# Patient Record
Sex: FEMALE | Race: WHITE | NOT HISPANIC OR LATINO | URBAN - METROPOLITAN AREA
[De-identification: names, ages, dates, MRNs, and addresses within clinical notes are randomized per-mention and may not be internally consistent; named-entity substitution may affect disease eponyms.]

---

## 2019-10-02 ENCOUNTER — EMERGENCY (EMERGENCY)
Facility: HOSPITAL | Age: 44
LOS: 1 days | Discharge: HOME | End: 2019-10-02
Admitting: EMERGENCY MEDICINE
Payer: MEDICAID

## 2019-10-02 VITALS
OXYGEN SATURATION: 100 % | TEMPERATURE: 98 F | DIASTOLIC BLOOD PRESSURE: 72 MMHG | SYSTOLIC BLOOD PRESSURE: 151 MMHG | RESPIRATION RATE: 18 BRPM | HEART RATE: 90 BPM

## 2019-10-02 PROCEDURE — 99283 EMERGENCY DEPT VISIT LOW MDM: CPT

## 2019-10-02 RX ORDER — OXYCODONE AND ACETAMINOPHEN 5; 325 MG/1; MG/1
1 TABLET ORAL ONCE
Refills: 0 | Status: COMPLETED | OUTPATIENT
Start: 2019-10-02 | End: 2019-10-02

## 2019-10-02 NOTE — ED ADULT TRIAGE NOTE - CHIEF COMPLAINT QUOTE
Pt. c/o toothache in left lower tooth. Reports taking 325mg of aspirin around 6 pm without relief. States she is here for pain relief until she can see her dentist. Denies fever, chills.

## 2019-10-02 NOTE — ED PROVIDER NOTE - NSFOLLOWUPINSTRUCTIONS_ED_ALL_ED_FT
Please follow up in the Dental Clinic within 1 week. Please call [phone].  Please take all medication as prescribed. Please do not drive a motor vehicle while taking percocet as it can cause dizziness and drowsiness.

## 2019-10-02 NOTE — ED PROVIDER NOTE - CLINICAL SUMMARY MEDICAL DECISION MAKING FREE TEXT BOX
44 y/o female w/ tooth pain- no abscess on exam- will give dental block, augmentin, dental clinic f/u.

## 2019-10-02 NOTE — ED PROVIDER NOTE - PATIENT PORTAL LINK FT
You can access the FollowMyHealth Patient Portal offered by [facility] by registering at the following website: [email or web address]. By joining [employer]’s FollowMyHealth portal, you will also be able to view your health information using other applications (apps) compatible with our system.

## 2019-10-02 NOTE — ED PROVIDER NOTE - OBJECTIVE STATEMENT
45 y/o female no pmh c/o left lower tooth pain x2-3 months, worse x1 week. Pt admit sto pain with hot and cold. Pt states that about 1 week ago she had swelling to her gum and she "popped an abscess". Pt admits to draining pus from wound. Pt now c/o pain to area. Denies swelling, current drainage, difficulty breathing or swallowing, fever or chills.

## 2019-10-24 ENCOUNTER — EMERGENCY (EMERGENCY)
Facility: HOSPITAL | Age: 44
LOS: 1 days | Discharge: HOME | End: 2019-10-24
Attending: STUDENT IN AN ORGANIZED HEALTH CARE EDUCATION/TRAINING PROGRAM
Payer: MEDICAID

## 2019-10-24 VITALS
TEMPERATURE: 98 F | RESPIRATION RATE: 18 BRPM | SYSTOLIC BLOOD PRESSURE: 142 MMHG | DIASTOLIC BLOOD PRESSURE: 89 MMHG | OXYGEN SATURATION: 95 % | HEART RATE: 91 BPM

## 2019-10-24 PROBLEM — Z78.9 OTHER SPECIFIED HEALTH STATUS: Chronic | Status: ACTIVE | Noted: 2019-10-02

## 2019-10-24 PROCEDURE — 99283 EMERGENCY DEPT VISIT LOW MDM: CPT

## 2019-10-24 RX ORDER — ALPRAZOLAM 0.25 MG
1 TABLET ORAL ONCE
Refills: 0 | Status: DISCONTINUED | OUTPATIENT
Start: 2019-10-24 | End: 2019-10-24

## 2019-10-24 RX ADMIN — Medication 1 MILLIGRAM(S): at 09:18

## 2019-10-24 NOTE — ED PROVIDER NOTE - CLINICAL SUMMARY MEDICAL DECISION MAKING FREE TEXT BOX
44F with increased anxiety, unable to access her home meds due to being arrested.  No physical complaints.  No SI/HI.  Pt calm and cooperative.  Will supply one dose of home medication and discharge with PD.

## 2019-10-24 NOTE — ED ADULT TRIAGE NOTE - CHIEF COMPLAINT QUOTE
pt arrives by ambulance, under arrest, with hx of anxiety. states she needs her medications for anxiety. denies feeling anxious at this time.

## 2019-10-24 NOTE — ED PROVIDER NOTE - OBJECTIVE STATEMENT
44F pmh anxiety and pinched nerve presents to ER under arrest c/o increased anxiety.  Pt's home medications (alprazolam 1mg q 8hr) were confiscated and patient was not allowed to access them.  PD is bringing patient to central booking, but patient was complaining of increased anxiety and requesting her home meds so they came to ER for medication.  Pt states she feels she is breathing quickly and feels more anxious. 44F pmh anxiety and pinched nerve presents to ER under arrest c/o increased anxiety.  Pt's home medications (alprazolam 1mg q 8hr) were confiscated and patient was not allowed to access them.  PD is bringing patient to central booking, but patient was complaining of increased anxiety and requesting her home meds so they came to ER for medication.  Pt states she feels she is breathing quickly and feels more anxious.  Denies SI/HI.  No chest pain, abd pain, nausea/vomiting, fever, chills.

## 2019-10-24 NOTE — ED PROVIDER NOTE - PHYSICAL EXAMINATION
Gen: NAD, AOx3, in hand cuffs  Head: NCAT  HEENT: PERRL, oral mucosa moist, normal conjunctiva  Lung: CTAB, no respiratory distress  CV: rrr, no murmurs, Normal perfusion  Abd: soft, NTND  MSK: No edema, no visible deformities  Neuro: No focal neurologic deficits  Skin: No rash   Psych: mildly anxious appearing

## 2019-10-24 NOTE — ED PROVIDER NOTE - PATIENT PORTAL LINK FT
You can access the FollowMyHealth Patient Portal offered by [facility] by registering at the following website: [email or web address]. By joining [employer]’s FollowMyHealth portal, you will also be able to view your health information using other applications (apps) compatible with our system.

## 2019-10-24 NOTE — ED PROVIDER NOTE - NSFOLLOWUPINSTRUCTIONS_ED_ALL_ED_FT
- Follow up with your primary care physician within 2-3days for reevaluation.  - Return to the Emergency Department for worsening, progressive or any other concerning symptoms.

## 2020-01-12 ENCOUNTER — EMERGENCY (EMERGENCY)
Facility: HOSPITAL | Age: 45
LOS: 1 days | Discharge: HOME | End: 2020-01-12
Attending: EMERGENCY MEDICINE | Admitting: EMERGENCY MEDICINE
Payer: MEDICAID

## 2020-01-12 VITALS
RESPIRATION RATE: 18 BRPM | TEMPERATURE: 98 F | DIASTOLIC BLOOD PRESSURE: 86 MMHG | HEART RATE: 89 BPM | SYSTOLIC BLOOD PRESSURE: 166 MMHG | OXYGEN SATURATION: 99 %

## 2020-01-12 PROCEDURE — 99283 EMERGENCY DEPT VISIT LOW MDM: CPT

## 2020-01-12 RX ORDER — OXYCODONE HYDROCHLORIDE 5 MG/1
5 TABLET ORAL ONCE
Refills: 0 | Status: DISCONTINUED | OUTPATIENT
Start: 2020-01-12 | End: 2020-01-12

## 2020-01-12 RX ADMIN — Medication 1 TABLET(S): at 11:31

## 2020-01-12 RX ADMIN — OXYCODONE HYDROCHLORIDE 5 MILLIGRAM(S): 5 TABLET ORAL at 11:31

## 2020-01-12 NOTE — ED ADULT TRIAGE NOTE - CHIEF COMPLAINT QUOTE
Pt complaining of dental pain x1 week. pt states her tooth broke and since then has been having pain. pt states she saw a [facility] dentist and was prescribed Amoxicillin and Motrin with minimal relief.

## 2020-01-12 NOTE — ED PROVIDER NOTE - ATTENDING CONTRIBUTION TO CARE
Dr. Fairchild:  I have personally performed a face to face bedside history and physical examination of this patient. I have discussed the history, examination, review of systems, assessment and plan of management with the resident. I have reviewed the electronic medical record and amended it to reflect my history, review of systems, physical exam, assessment and plan.    44F c/o pain to cracked upper left molar.  Had tele-dentist consult, coming in requesting abx.  Denies fevers and other constitutional symptoms.    Exam:  - mild swelling upper gum but no fluctuance, no drainage    A/P  - likely pain from cracked tooth, no signs of abscess  - abx, f/u dentist

## 2020-01-12 NOTE — ED PROVIDER NOTE - PATIENT PORTAL LINK FT
You can access the FollowMyHealth Patient Portal offered by [facility] by registering at the following website: [email or web address]. By joining [employer]’s FollowMyHealth portal, you will also be able to view your health information using other applications (apps) compatible with our system.

## 2020-01-12 NOTE — ED PROVIDER NOTE - NSFOLLOWUPINSTRUCTIONS_ED_ALL_ED_FT
You were seen and evaluated in the Emergency Department for your tooth pain. You were evaluated clinically.    Your exam demonstrates that there is likely an infection of your tooth that will require further evaluation and intervention by a Dental Specialist. Please follow up at the Dental Clinic (contact info below) for further evaluation of your infection.     [facility]  [phone] [place] [address] [phone]    In the interim we have provided you with antibiotics (at your pharmacy of choice); please take the antibiotics as prescribed to prevent further progression of the infection.     Should you develop new or worsening tooth pain, fevers, chills, discharge from the tooth site, trouble opening your mouth/swallowing, nausea, vomiting - please return to the ED for immediate evaluation.

## 2020-01-12 NOTE — ED PROVIDER NOTE - NS ED ROS FT
Constitution: No Fever or chills, No Weight Loss,   Eyes: No visual changes  HEENT: (+) Dental Pain; No cough, No Discharge, No Rhinorrhea, No URI symptoms  Cardio: No Chest pain, No Palpitations, No Dyspnea  Resp: No SOB, No Wheezing  GI: No abdominal pain, No Nausea, No Vomiting, No Constipation, No Diarrhea  : No burning upon urination, trouble urinating, no foul odor from urine  MSK: No Back pain, No Numbness, No Tingling, No Weakness  Neuro: No Headache, No changes to Vision, No changes to Hearing, Normal Gait  Skin: No rashes, No Bruising, No Swelling

## 2020-01-12 NOTE — ED PROVIDER NOTE - CLINICAL SUMMARY MEDICAL DECISION MAKING FREE TEXT BOX
42's female, Hx: Dental Infection, Smoker -presents with 4 days of left upper molar dental pain s/p cracking the tooh 4 days ago on a piece of food. Pt denies any fevers, chills, nausea, vomiting, swelling of the face, trouble swallowing, trismus. Exam without any evidence of a dental abscess - no purulent drainage expressed, no fluctuance or protuberance evident on exam. Pt declining dental consultation at this time for further evaluation/management, reporting she has a formal extraction scheduled later this week. Requesting Augmentin and Pain medication. Will discharge home.

## 2020-01-12 NOTE — ED PROVIDER NOTE - PHYSICAL EXAMINATION
GEN - NAD; well appearing; A+Ox3   HEAD - NC/AT, No visible Ecchymosis, No Abrasions, No Lacerations/Skin Tears     EYES - EOMI, PERRL, no conjunctival pallor, no scleral icterus  ENT -   (+) Tenderness over Left Most Posterior Molar, no evidence of gingival swelling/fluctuance/or purulent drainage, (-) Trismus, (-) Facial Swelling   NECK - Neck supple, No LAD, No Swelling  PULM - CTA B/L,  symmetric breath sounds  COR -  RRR, S1 S2, no murmurs  ABD - NT/ND, soft, no guarding, no rebound, no masses    BACK - no CVA tenderness, nontender spine

## 2020-01-12 NOTE — ED PROVIDER NOTE - OBJECTIVE STATEMENT
44 female, Hx. Smoking and Prior Dental Infection - presents with 4 days of left upper molar pain that began after eating dinner. Pt denies any fevers, chills, nausea, vomiting, trismus, swelling in her mouth, difficulty breathing, or trouble swallowing. Pt reports she is in touch with a dentist and has a formal appointment on Thursday this week for a scheduled extraction of the tooth, and declines any dental consultation and treatment in [facility] ED at this time. Pt denies any purulent drainage, gingival swelling, trismus, or facial swelling. Requesting antibiotics and pain medication to get her through this week.

## 2020-01-28 ENCOUNTER — OUTPATIENT (OUTPATIENT)
Dept: OUTPATIENT SERVICES | Facility: HOSPITAL | Age: 45
LOS: 1 days | Discharge: HOME | End: 2020-01-28
Payer: MEDICAID

## 2020-01-28 PROCEDURE — 99443: CPT

## 2020-01-28 PROCEDURE — 90792 PSYCH DIAG EVAL W/MED SRVCS: CPT

## 2020-01-29 DIAGNOSIS — F43.23 ADJUSTMENT DISORDER WITH MIXED ANXIETY AND DEPRESSED MOOD: ICD-10-CM

## 2020-06-01 ENCOUNTER — OUTPATIENT (OUTPATIENT)
Dept: OUTPATIENT SERVICES | Facility: HOSPITAL | Age: 45
LOS: 1 days | End: 2020-06-01
Payer: MEDICAID

## 2020-06-09 PROCEDURE — ZZZZZ: CPT

## 2020-06-11 ENCOUNTER — EMERGENCY (EMERGENCY)
Facility: HOSPITAL | Age: 45
LOS: 1 days | Discharge: HOME | End: 2020-06-11
Attending: STUDENT IN AN ORGANIZED HEALTH CARE EDUCATION/TRAINING PROGRAM | Admitting: EMERGENCY MEDICINE
Payer: MEDICAID

## 2020-06-11 VITALS
TEMPERATURE: 98 F | HEART RATE: 110 BPM | RESPIRATION RATE: 17 BRPM | SYSTOLIC BLOOD PRESSURE: 131 MMHG | DIASTOLIC BLOOD PRESSURE: 78 MMHG

## 2020-06-11 LAB
ALBUMIN SERPL ELPH-MCNC: 4.4 G/DL — SIGNIFICANT CHANGE UP (ref 3.3–5)
ALP SERPL-CCNC: 89 U/L — SIGNIFICANT CHANGE UP (ref 40–120)
ALT FLD-CCNC: 49 U/L — HIGH (ref 4–33)
ANION GAP SERPL CALC-SCNC: 12 MMO/L — SIGNIFICANT CHANGE UP (ref 7–14)
AST SERPL-CCNC: 39 U/L — HIGH (ref 4–32)
BASOPHILS # BLD AUTO: 0.08 K/UL — SIGNIFICANT CHANGE UP (ref 0–0.2)
BASOPHILS NFR BLD AUTO: 0.6 % — SIGNIFICANT CHANGE UP (ref 0–2)
BILIRUB SERPL-MCNC: < 0.2 MG/DL — LOW (ref 0.2–1.2)
BUN SERPL-MCNC: 11 MG/DL — SIGNIFICANT CHANGE UP (ref 7–23)
CALCIUM SERPL-MCNC: 9.7 MG/DL — SIGNIFICANT CHANGE UP (ref 8.4–10.5)
CHLORIDE SERPL-SCNC: 103 MMOL/L — SIGNIFICANT CHANGE UP (ref 98–107)
CO2 SERPL-SCNC: 25 MMOL/L — SIGNIFICANT CHANGE UP (ref 22–31)
CREAT SERPL-MCNC: 0.7 MG/DL — SIGNIFICANT CHANGE UP (ref 0.5–1.3)
EOSINOPHIL # BLD AUTO: 0.39 K/UL — SIGNIFICANT CHANGE UP (ref 0–0.5)
EOSINOPHIL NFR BLD AUTO: 2.9 % — SIGNIFICANT CHANGE UP (ref 0–6)
GLUCOSE SERPL-MCNC: 109 MG/DL — HIGH (ref 70–99)
HCT VFR BLD CALC: 42.8 % — SIGNIFICANT CHANGE UP (ref 34.5–45)
HGB BLD-MCNC: 14.5 G/DL — SIGNIFICANT CHANGE UP (ref 11.5–15.5)
IMM GRANULOCYTES NFR BLD AUTO: 0.4 % — SIGNIFICANT CHANGE UP (ref 0–1.5)
LYMPHOCYTES # BLD AUTO: 36 % — SIGNIFICANT CHANGE UP (ref 13–44)
LYMPHOCYTES # BLD AUTO: 4.92 K/UL — HIGH (ref 1–3.3)
MCHC RBC-ENTMCNC: 32.7 PG — SIGNIFICANT CHANGE UP (ref 27–34)
MCHC RBC-ENTMCNC: 33.9 % — SIGNIFICANT CHANGE UP (ref 32–36)
MCV RBC AUTO: 96.6 FL — SIGNIFICANT CHANGE UP (ref 80–100)
MONOCYTES # BLD AUTO: 0.86 K/UL — SIGNIFICANT CHANGE UP (ref 0–0.9)
MONOCYTES NFR BLD AUTO: 6.3 % — SIGNIFICANT CHANGE UP (ref 2–14)
NEUTROPHILS # BLD AUTO: 7.34 K/UL — SIGNIFICANT CHANGE UP (ref 1.8–7.4)
NEUTROPHILS NFR BLD AUTO: 53.8 % — SIGNIFICANT CHANGE UP (ref 43–77)
NRBC # FLD: 0 K/UL — SIGNIFICANT CHANGE UP (ref 0–0)
PLATELET # BLD AUTO: 343 K/UL — SIGNIFICANT CHANGE UP (ref 150–400)
PMV BLD: 10.1 FL — SIGNIFICANT CHANGE UP (ref 7–13)
POTASSIUM SERPL-MCNC: 4.1 MMOL/L — SIGNIFICANT CHANGE UP (ref 3.5–5.3)
POTASSIUM SERPL-SCNC: 4.1 MMOL/L — SIGNIFICANT CHANGE UP (ref 3.5–5.3)
PROT SERPL-MCNC: 7.4 G/DL — SIGNIFICANT CHANGE UP (ref 6–8.3)
RBC # BLD: 4.43 M/UL — SIGNIFICANT CHANGE UP (ref 3.8–5.2)
RBC # FLD: 13.1 % — SIGNIFICANT CHANGE UP (ref 10.3–14.5)
SODIUM SERPL-SCNC: 140 MMOL/L — SIGNIFICANT CHANGE UP (ref 135–145)
WBC # BLD: 13.65 K/UL — HIGH (ref 3.8–10.5)
WBC # FLD AUTO: 13.65 K/UL — HIGH (ref 3.8–10.5)

## 2020-06-11 PROCEDURE — 99284 EMERGENCY DEPT VISIT MOD MDM: CPT

## 2020-06-11 PROCEDURE — 70498 CT ANGIOGRAPHY NECK: CPT | Mod: 26

## 2020-06-11 PROCEDURE — 70496 CT ANGIOGRAPHY HEAD: CPT | Mod: 26

## 2020-06-11 RX ORDER — METOCLOPRAMIDE HCL 10 MG
10 TABLET ORAL ONCE
Refills: 0 | Status: COMPLETED | OUTPATIENT
Start: 2020-06-11 | End: 2020-06-11

## 2020-06-11 RX ORDER — SODIUM CHLORIDE 9 MG/ML
1000 INJECTION INTRAMUSCULAR; INTRAVENOUS; SUBCUTANEOUS ONCE
Refills: 0 | Status: COMPLETED | OUTPATIENT
Start: 2020-06-11 | End: 2020-06-11

## 2020-06-11 RX ADMIN — SODIUM CHLORIDE 1000 MILLILITER(S): 9 INJECTION INTRAMUSCULAR; INTRAVENOUS; SUBCUTANEOUS at 16:17

## 2020-06-11 RX ADMIN — Medication 10 MILLIGRAM(S): at 16:17

## 2020-06-11 NOTE — ED ADULT TRIAGE NOTE - CHIEF COMPLAINT QUOTE
pt states she has been having head pressure, worse with movement. states she feels as if she is having short term memory loss.

## 2020-06-11 NOTE — ED PROVIDER NOTE - PHYSICAL EXAMINATION
[Const] well-appearing, resting comfortably, no acute distress  [HEENT] PERRL, EOMI, moist mucus membranes  [Neck] Supple, trachea midline  [CV] +S1/S2, no m/r/g appreciated  [Lungs] Clear to auscultations bilaterally, no adventitious lung sounds  [Abd] soft, non-tender, nondistended in all 4 quadrants  [MSK] 5/5 upper extremity and lower extremity str bilaterally  [Skin] warm, dry, well-perfused  [Neuro] A&Ox3, Cranial Nerves II-XII intact, neg dysmetria/ataxia/pronator drift, neg rhomberg

## 2020-06-11 NOTE — ED PROVIDER NOTE - OBJECTIVE STATEMENT
44 y/o F p/w head pressure x9 days since 6/2. On 6/2, sensation of vessel bursting in brain, had worst headache/excruciating pressure sensation. Sudden onset, maximal intensity at onset. Pressure/pain worsens when moving side to side or lying down. Pain is worse in the morning, feels there is more pressure. +Short term memory loss, forgot 's license once. Worsens with stress, high blood pressure. States she had some initial blurry vision 9 days ago. Worried that she has COVID. Denies shortness of breath, chest pain, flu-like illness, COVID contacts, sick contacts. Denies nausea/vomiting, head trauma injury, light sensitivity, phonophobia, current visual changes, blurry vision, flashes/floaters. Denies slurred speech, changes in strength/sensation in arms or legs, changes in gait, dysarthria, word finding difficulties. Endorses some worsening dysequilibrium in the past weak. Denies dizziness, vertigo.     Pmh: Depression/ anxiety  meds: Lexapro, alprazolam, zolpidem  Allergies: nkda  Surgeries: BL tubal ligation  Social: smokes 1ppd, EtOh socially, denies illicit drug use 46 y/o F p/w head pressure x9 days since 6/2. On 6/2, sensation of "vessel bursting in brain", had worst headache/excruciating pressure sensation. Sudden onset, maximal intensity at onset. Pressure/pain worsens when moving side to side or lying down. Pain is worse in the morning, feels there is more pressure. +Short term memory loss, forgot 's license once. Worsens with stress, high blood pressure. States she had some initial blurry vision 9 days ago. Worried that she has COVID. Denies shortness of breath, chest pain, flu-like illness, COVID contacts, sick contacts. Denies nausea/vomiting, head trauma injury, light sensitivity, phonophobia, current visual changes, blurry vision, flashes/floaters. Denies slurred speech, changes in strength/sensation in arms or legs, changes in gait, dysarthria, word finding difficulties. Endorses some worsening dysequilibrium in the past weak. Denies dizziness, vertigo.     Pmh: Depression/ anxiety  meds: Lexapro, alprazolam, zolpidem  Allergies: nkda  Surgeries: BL tubal ligation  Social: smokes 1ppd, EtOh socially, denies illicit drug use

## 2020-06-11 NOTE — ED PROVIDER NOTE - CLINICAL SUMMARY MEDICAL DECISION MAKING FREE TEXT BOX
46 y/o F w/ hx of anxiety/depression p/w maximal onset headache, now causing moderate pressure/headache x9 days. Worsens with position and during morning. No focal neuro deficits on exam. Give fluids, reglan. Obtain CTA head/neck to r/o SAH, intracranial pathology, aneurysm or bleed, approx 98% sensitivity. If negative, d/c with close neurology f/u.

## 2020-06-11 NOTE — ED PROVIDER NOTE - ATTENDING CONTRIBUTION TO CARE
I performed a history and physical exam of the patient and discussed their management with the resident.  I reviewed the resident's note and agree with the documented findings and plan of care except as noted below. My medical decision making and observations are as follows:    44 y/o F pmh anxiety/depression p/w maximal onset headache 9 days ago, now causing moderate pressure/headache.  headache has been lingering.  Worsens with position and in the morning.  Denies photophobia, phonophobia, numbness, weakness, paresthesias.  No fever, chills, nausea/vomiting/diarrhea. Pt well appearing in NAD, head NCAT, neck supple, PERRL, EOMI, equal strength and sensation through out, normal gait.  While initial pain 9 days ago sounds concerning for SAH, patient is quite well appearing today and has no neuro deficits.  Will CT head, CTA head/neck which has high sensitivity for sah - will discuss utility of lp this far out from original pain with neuro and reassess.  labs, analgesia and reassess.

## 2020-06-11 NOTE — ED PROVIDER NOTE - NSFOLLOWUPINSTRUCTIONS_ED_ALL_ED_FT
Please follow-up with neurology, a referral list has been attached to your discharge paperwork. See them within the next 2-3 days.     You were seen in the Emergency Department for headache.  1) Advance activity as tolerated.    2) Continue all previously prescribed medications as directed.    3) Follow up with your primary care physician in 24-48 hours - take copies of your results.    4) Return to the Emergency Department for worsening or persistent symptoms, and/or ANY NEW OR CONCERNING SYMPTOMS as described below.    Headache    A headache is pain or discomfort felt around the head or neck area. The specific cause of a headache may not be found as there are many types including tension headaches, migraine headaches, and cluster headaches. Watch your condition for any changes. Things you can do to manage your pain include taking over the counter and prescription medications as instructed by your health care provider, lying down in a dark quiet room, limiting stress, getting regular sleep, and refraining from alcohol and tobacco products.    SEEK IMMEDIATE MEDICAL CARE IF YOU HAVE ANY OF THE FOLLOWING SYMPTOMS: fever, vomiting, stiff neck, loss of vision, problems with speech, muscle weakness, loss of balance, trouble walking, passing out, or confusion.

## 2020-06-11 NOTE — ED PROVIDER NOTE - PATIENT PORTAL LINK FT
You can access the FollowMyHealth Patient Portal offered by [facility] by registering at the following website: [email or web address]. By joining [employer]’s FollowMyHealth portal, you will also be able to view your health information using other applications (apps) compatible with our system.

## 2020-06-11 NOTE — ED ADULT NURSE NOTE - OBJECTIVE STATEMENT
Pt aa&ox3 presenting to ED for "head pressure" starting June 2nd. Pt denies visual changes/dizziness

## 2020-06-11 NOTE — ED PROVIDER NOTE - PROGRESS NOTE DETAILS
disucssed utility of LP 9 days out of original pain.  Some studies show utility 1-2 weeks out, but the sensitivity declines and may not be of utility.  Will await CTAs, and reassess pt's symptoms.  Will use shared decision making to determine best course of action based on results.  - Meche Johnson, DO Zoe, PGY-1: Discussed risks and benefits of further testing with patient, given negative CTA head/neck. If SAH significant enough to be positive for xanthochromia, would likely also show up in CTA head/neck. Patient declined LP at this time Zoe, PGY-1: Pt reassessed multiple times in the past several hours. Doing well, symptoms improved, asx at this time. Discussed risks and benefits of further testing with patient, given negative CTA head/neck. If SAH significant enough to be positive for xanthochromia, would likely also show up in CTA head/neck. Patient declined LP at this time

## 2020-06-15 DIAGNOSIS — Z71.89 OTHER SPECIFIED COUNSELING: ICD-10-CM

## 2020-06-15 PROCEDURE — G9001: CPT

## 2020-06-23 PROBLEM — Z00.00 ENCOUNTER FOR PREVENTIVE HEALTH EXAMINATION: Status: ACTIVE | Noted: 2020-06-23

## 2020-06-30 ENCOUNTER — APPOINTMENT (OUTPATIENT)
Dept: NEUROLOGY | Facility: CLINIC | Age: 45
End: 2020-06-30

## 2020-07-07 ENCOUNTER — APPOINTMENT (OUTPATIENT)
Dept: NEUROLOGY | Facility: CLINIC | Age: 45
End: 2020-07-07
Payer: MEDICAID

## 2020-07-07 DIAGNOSIS — R51 HEADACHE: ICD-10-CM

## 2020-07-07 PROCEDURE — 99204 OFFICE O/P NEW MOD 45 MIN: CPT | Mod: 95

## 2020-07-07 NOTE — PHYSICAL EXAM
[FreeTextEntry1] : Alert and oriented. No aphasia. Short term recall 3/3 after 3 minutes. Able to spell and reverse spelling of a 5-letter word. No focal neurologic deficits observed with limitations of audiovisual [place] connection.\par Patient states she is 5'7" and weighs 216 lbs (BMI 33.8)

## 2020-07-07 NOTE — ASSESSMENT
[FreeTextEntry1] : Patient advised to have sleep specialist evaluation. Chronic daily headaches may be related to NAOMIE. She was urged to stop smoking and encouraged to lose weight with appropriate exercise and diet.\par \par She will need to follow-up in office in next 2 months for a detailed neurologic examination.
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
## 2020-08-10 ENCOUNTER — APPOINTMENT (OUTPATIENT)
Dept: PULMONOLOGY | Facility: CLINIC | Age: 45
End: 2020-08-10
Payer: MEDICAID

## 2020-08-10 VITALS
TEMPERATURE: 97.1 F | SYSTOLIC BLOOD PRESSURE: 148 MMHG | WEIGHT: 266 LBS | HEART RATE: 111 BPM | BODY MASS INDEX: 41.26 KG/M2 | DIASTOLIC BLOOD PRESSURE: 101 MMHG | RESPIRATION RATE: 17 BRPM | HEIGHT: 67.5 IN | OXYGEN SATURATION: 98 %

## 2020-08-10 DIAGNOSIS — F41.9 ANXIETY DISORDER, UNSPECIFIED: ICD-10-CM

## 2020-08-10 DIAGNOSIS — R06.83 SNORING: ICD-10-CM

## 2020-08-10 DIAGNOSIS — Z80.9 FAMILY HISTORY OF MALIGNANT NEOPLASM, UNSPECIFIED: ICD-10-CM

## 2020-08-10 DIAGNOSIS — F32.9 ANXIETY DISORDER, UNSPECIFIED: ICD-10-CM

## 2020-08-10 DIAGNOSIS — Z72.0 TOBACCO USE: ICD-10-CM

## 2020-08-10 DIAGNOSIS — G47.00 INSOMNIA, UNSPECIFIED: ICD-10-CM

## 2020-08-10 DIAGNOSIS — G47.52 REM SLEEP BEHAVIOR DISORDER: ICD-10-CM

## 2020-08-10 DIAGNOSIS — Z78.9 OTHER SPECIFIED HEALTH STATUS: ICD-10-CM

## 2020-08-10 DIAGNOSIS — Z82.49 FAMILY HISTORY OF ISCHEMIC HEART DISEASE AND OTHER DISEASES OF THE CIRCULATORY SYSTEM: ICD-10-CM

## 2020-08-10 PROCEDURE — 99407 BEHAV CHNG SMOKING > 10 MIN: CPT

## 2020-08-10 PROCEDURE — 99204 OFFICE O/P NEW MOD 45 MIN: CPT | Mod: 25

## 2020-08-11 PROBLEM — Z72.0 TOBACCO USE: Status: ACTIVE | Noted: 2020-08-11

## 2020-08-11 PROBLEM — Z78.9 SOCIAL ALCOHOL USE: Status: ACTIVE | Noted: 2020-08-11

## 2020-08-11 PROBLEM — Z80.9 FAMILY HISTORY OF MALIGNANT NEOPLASM: Status: ACTIVE | Noted: 2020-08-11

## 2020-08-11 PROBLEM — R06.83 SNORING: Status: ACTIVE | Noted: 2020-08-11

## 2020-08-11 PROBLEM — F41.9 ANXIETY AND DEPRESSION: Status: ACTIVE | Noted: 2020-08-11

## 2020-08-11 PROBLEM — G47.52 REM SLEEP BEHAVIOR DISORDER: Status: ACTIVE | Noted: 2020-08-11

## 2020-08-11 PROBLEM — Z82.49 FAMILY HISTORY OF CORONARY ARTERY DISEASE: Status: ACTIVE | Noted: 2020-08-11

## 2020-08-11 RX ORDER — ZOLPIDEM TARTRATE 10 MG/1
10 TABLET ORAL
Refills: 0 | Status: ACTIVE | COMMUNITY

## 2020-08-11 RX ORDER — ALPRAZOLAM 1 MG/1
1 TABLET ORAL
Refills: 0 | Status: ACTIVE | COMMUNITY

## 2020-08-11 RX ORDER — ESCITALOPRAM OXALATE 10 MG/1
10 TABLET, FILM COATED ORAL
Refills: 0 | Status: ACTIVE | COMMUNITY

## 2020-08-11 NOTE — PHYSICAL EXAM
[General Appearance - Well Developed] : well developed [Normal Appearance] : normal appearance [General Appearance - Well Nourished] : well nourished [Enlarged Base of the Tongue] : enlargement of the base of the tongue [IV] : IV [Neck Appearance] : the appearance of the neck was normal [Apical Impulse] : the apical impulse was normal [Heart Sounds] : normal S1 and S2 [Neck Cervical Mass (___cm)] : no neck mass was observed [] : no respiratory distress [Respiration, Rhythm And Depth] : normal respiratory rhythm and effort [Auscultation Breath Sounds / Voice Sounds] : lungs were clear to auscultation bilaterally [Nail Clubbing] : no clubbing of the fingernails [Cyanosis, Localized] : no localized cyanosis [No Focal Deficits] : no focal deficits [Oriented To Time, Place, And Person] : oriented to person, place, and time [Impaired Insight] : insight and judgment were intact [Affect] : the affect was normal [FreeTextEntry2] : No edema

## 2020-08-11 NOTE — REVIEW OF SYSTEMS
[EDS: ESS=____] : daytime somnolence: ESS=[unfilled] [Fatigue] : fatigue [Recent Wt Gain (___ Lbs)] : recent [unfilled] ~Ulb weight gain [Snoring] : snoring [A.M. Dry Mouth] : a.m. dry mouth [Witnessed Apneas] : witnessed apnea [Obesity] : obesity [A.M. Headache] : headache present upon awakening [Depression] : depression [Anxious] : anxious [Difficulty Initiating Sleep] : difficulty falling asleep [Difficulty Maintaining Sleep] : difficulty maintaining sleep [Unusual Sleep Behavior] : unusual sleep behavior [Negative] : Musculoskeletal [Postnasal Drip] : no postnasal drip [Leg Dysesthesias] : no leg dysesthesias [Late day/ Evening symptoms] : no late day/evening symptoms [Irresistible urge to move legs] : no irresistible urge to move legs because of lower extremity discomfort [Lower Extremity Discomfort] : no lower extremity discomfort [Sleep Disturbances due to LE symptoms] : ~T no sleep disturbances due to lower extremity symptoms [Hypersomnolence] : not sleeping much more than usual [Hypnogogic Hallucinations] : no hypnogogic hallucinations [Cataplexy] :  no cataplexy [Sleep Paralysis] : no sleep paralysis [Hypnopompic Hallucinations] : no hypnopompic hallucinations

## 2020-08-11 NOTE — CONSULT LETTER
[Dear  ___] : Dear  [unfilled], [Consult Letter:] : I had the pleasure of evaluating your patient, [unfilled]. [Please see my note below.] : Please see my note below. [Consult Closing:] : Thank you very much for allowing me to participate in the care of this patient.  If you have any questions, please do not hesitate to contact me. [Sincerely,] : Sincerely, [FreeTextEntry3] : Jus Pollack DO, MPH\par Pulmonary, Critical Care, and Sleep Medicine\par  of Medicine\par [employer] [facility]
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
## 2020-08-11 NOTE — ASSESSMENT
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 [Discussed Risks and Advised to Quit Smoking] : Discussed risks and advised to quit smoking [Discussed Cessation Medication] : cessation medication was discussed [Discussed Cessation Strategies] : cessation strategies were discussed [Smoking Cessation Counseling Given (more than 10 minutes) and Resources Provided] : Smoking cessation counseling given (more than 10 minutes) and resources provided [Not Ready] : Patient is not ready for cessation intervention [Contemplation] : Contemplation: The patient is considering quitting smoking [Withdrawal symptoms] : withdrawal symptoms [Stress] : stress [No Support System] : no support system [FreeTextEntry1] : This is a 45 year old female referred by Dr. Faustin for evaluation of possible sleep apnea. The patient has multiple signs and symptoms of sleep-disordered breathing include loud snoring, witnessed apnea, frequent nocturnal awakenings, and morning headaches. She also has prolong headaches and hypertension which are associated with NAOMIE. Given her weight and smoking history there is a concern for nocturnal hypoventilation especially as she is taking zolpidem. 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 This is likely caused by either untreated NAOMIE or her Lexapro. She was referred to the [facility] for a diagnostic PSG. The ramifications of NAOMIE and its potential therapeutic modalities were discussed with the patient. The patient was cautioned on the importance of avoiding drowsy driving. She will follow up with us after the PSG.\par

## 2020-08-11 NOTE — REASON FOR VISIT
[Consultation] : a consultation visit [Sleep Apnea] : sleep apnea [FreeTextEntry1] : Referred by Dr. Faustin

## 2020-08-31 DIAGNOSIS — Z01.818 ENCOUNTER FOR OTHER PREPROCEDURAL EXAMINATION: ICD-10-CM

## 2020-09-02 ENCOUNTER — APPOINTMENT (OUTPATIENT)
Dept: DISASTER EMERGENCY | Facility: CLINIC | Age: 45
End: 2020-09-02

## 2020-09-03 ENCOUNTER — APPOINTMENT (OUTPATIENT)
Dept: DISASTER EMERGENCY | Facility: CLINIC | Age: 45
End: 2020-09-03

## 2020-09-04 ENCOUNTER — APPOINTMENT (OUTPATIENT)
Dept: NEUROLOGY | Facility: CLINIC | Age: 45
End: 2020-09-04
Payer: MEDICAID

## 2020-09-04 VITALS
DIASTOLIC BLOOD PRESSURE: 85 MMHG | WEIGHT: 268 LBS | HEART RATE: 119 BPM | SYSTOLIC BLOOD PRESSURE: 137 MMHG | HEIGHT: 67.5 IN | BODY MASS INDEX: 41.57 KG/M2

## 2020-09-04 VITALS — TEMPERATURE: 97.2 F

## 2020-09-04 DIAGNOSIS — R51 HEADACHE: ICD-10-CM

## 2020-09-04 LAB — SARS-COV-2 N GENE NPH QL NAA+PROBE: NOT DETECTED

## 2020-09-04 PROCEDURE — 99214 OFFICE O/P EST MOD 30 MIN: CPT
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
## 2020-09-04 NOTE — PHYSICAL EXAM
[FreeTextEntry1] : Morbidly obese. Alert and oriented. No cognitive or communication deficits. Visual fields are full to confrontation. Optic disc margins are sharp. Pupils equal and constrict to light. Extraocular movements intact. No facial asymmetry. Hearing intact to finger rub. Palate rises symmetrically and tongue protrudes in midline. Neck is supple. No bruits heard. No weakness or sensory deficits. Tendon reflexes are all active and symmetric. Plantars are flexor. Gait and coordination intact. Heart sounds are normal. No murmurs heard.\par

## 2020-09-04 NOTE — REVIEW OF SYSTEMS
[Feeling Tired] : feeling tired [Recent Weight Gain (___ Lbs)] : recent [unfilled] ~Ulb weight gain [Sleep Disturbances] : sleep disturbances [As Noted in HPI] : as noted in HPI [Anxiety] : anxiety [Negative] : Heme/Lymph

## 2020-09-08 ENCOUNTER — APPOINTMENT (OUTPATIENT)
Dept: SLEEP CENTER | Facility: CLINIC | Age: 45
End: 2020-09-08

## 2020-09-18 ENCOUNTER — APPOINTMENT (OUTPATIENT)
Dept: BARIATRICS | Facility: CLINIC | Age: 45
End: 2020-09-18
Payer: MEDICAID

## 2020-09-18 VITALS — WEIGHT: 266 LBS | HEIGHT: 67 IN | BODY MASS INDEX: 41.75 KG/M2

## 2020-09-18 DIAGNOSIS — Z13.29 ENCOUNTER FOR SCREENING FOR OTHER SUSPECTED ENDOCRINE DISORDER: ICD-10-CM

## 2020-09-18 DIAGNOSIS — Z13.0 ENCOUNTER FOR SCREENING FOR OTHER SUSPECTED ENDOCRINE DISORDER: ICD-10-CM

## 2020-09-18 DIAGNOSIS — Z13.228 ENCOUNTER FOR SCREENING FOR OTHER SUSPECTED ENDOCRINE DISORDER: ICD-10-CM

## 2020-09-18 DIAGNOSIS — Z13.0 ENCOUNTER FOR SCREENING FOR DISEASES OF THE BLOOD AND BLOOD-FORMING ORGANS AND CERTAIN DISORDERS INVOLVING THE IMMUNE MECHANISM: ICD-10-CM

## 2020-09-18 PROCEDURE — 99205 OFFICE O/P NEW HI 60 MIN: CPT | Mod: 95

## 2020-09-22 RX ORDER — MULTIVITAMIN
TABLET ORAL
Refills: 0 | Status: ACTIVE | COMMUNITY

## 2020-09-22 RX ORDER — MELATONIN 3 MG
TABLET ORAL
Refills: 0 | Status: ACTIVE | COMMUNITY

## 2020-09-22 NOTE — CONSULT LETTER
[Dear  ___] : Dear  [unfilled], [Consult Letter:] : I had the pleasure of evaluating your patient, [unfilled]. [Please see my note below.] : Please see my note below. [Consult Closing:] : Thank you very much for allowing me to participate in the care of this patient.  If you have any questions, please do not hesitate to contact me. [Sincerely,] : Sincerely, [FreeTextEntry3] : Veronika Rodriguez MD, FACS

## 2020-09-22 NOTE — HISTORY OF PRESENT ILLNESS
[de-identified] : 45 year old woman with a long-standing history of morbid obesity, who has attempted numerous weight loss treatments without long term success. Patient is familiar with the Laparoscopic Adjustable Gastric Band, the Laparoscopic Sleeve Gastrectomy and the Laparoscopic Gastric Bypass. Patient presents today to discuss options for surgery, specifically the Laparoscopic Sleeve Gastrectomy.

## 2020-09-22 NOTE — ASSESSMENT
[FreeTextEntry1] : 45 year old woman with long-standing history of morbid obesity presents today to discuss options for weight loss surgery.  I had an extensive discussion with the patient reviewing the Laparoscopic Sleeve Gastrectomy. Diagrams were used. All questions were answered.  \par \par Complications were discussed including but not limited to: vitamin and protein deficiencies, pneumonia, urinary infection, wound infection, leaks/peritonitis possibly requiring intraabdominal drains or reoperation, bleeding, DVT, pulmonary embolus, severe reflux, sleeve obstruction, abdominal wall hernias, revisions, death, inadequate weight loss. The importance of vitamins and protein supplementation was stressed, as was the importance of follow-up and exercise. \par \par Patient encouraged to make dietary and lifestyle changes in preparation for surgery.\par \par Patient with a long history of morbid obesity.She is interested in the Laparoscopic Sleeve Gastrectomy. She was given written material to review.  Pre-operative evaluations were reviewed. She will need to lose weight prior to surgery and will be seen in office prior to surgery.She was told to call with any questions. \par \par Must stop smoking.

## 2020-09-22 NOTE — REVIEW OF SYSTEMS
[Fatigue] : fatigue [Recent Change In Weight] : ~T recent weight change [Shortness Of Breath] : shortness of breath [Negative] : Allergic/Immunologic [FreeTextEntry9] : loss of range of motion, back pain [de-identified] : headaches

## 2020-10-20 ENCOUNTER — APPOINTMENT (OUTPATIENT)
Dept: BARIATRICS | Facility: CLINIC | Age: 45
End: 2020-10-20
Payer: MEDICAID

## 2020-10-20 VITALS — BODY MASS INDEX: 41.44 KG/M2 | HEIGHT: 67 IN | WEIGHT: 264 LBS

## 2020-10-20 DIAGNOSIS — R63.4 ABNORMAL WEIGHT LOSS: ICD-10-CM

## 2020-10-20 DIAGNOSIS — G47.33 OBSTRUCTIVE SLEEP APNEA (ADULT) (PEDIATRIC): ICD-10-CM

## 2020-10-20 DIAGNOSIS — F17.200 NICOTINE DEPENDENCE, UNSPECIFIED, UNCOMPLICATED: ICD-10-CM

## 2020-10-20 DIAGNOSIS — R63.5 ABNORMAL WEIGHT GAIN: ICD-10-CM

## 2020-10-20 PROCEDURE — 99214 OFFICE O/P EST MOD 30 MIN: CPT | Mod: 95

## 2020-10-20 NOTE — REVIEW OF SYSTEMS
[Joint Pain] : joint pain [Joint Stiffness] : joint stiffness [Muscle Pain] : muscle pain [Negative] : Endocrine [Abdominal Pain] : no abdominal pain [Reflux/Heartburn] : no reflux/ heartburn [Vomiting] : no vomiting [Diarrhea] : no diarrhea [FreeTextEntry2] : weight loss since last visit. [FreeTextEntry9] : Cervical and Lumbar Region.

## 2020-10-20 NOTE — PHYSICAL EXAM
[Obese, well nourished, in no acute distress] : obese, well nourished, in no acute distress [Normal] : affect appropriate [de-identified] : .

## 2020-10-20 NOTE — HISTORY OF PRESENT ILLNESS
[de-identified] : 45 year old F undergoing workup for laparoscopic sleeve gastrectomy here for a follow up WEIGH IN TELEMEDICINE VISIT due to COVID-19 Pandemic. Weight loss since last visit.  Patient is currently in the process of completing her workup as well as a medically supervised diet. Patient continues to make efforts to improve food choices and increased activity.Pt is following a protein focused diet - 3 meals a day - consuming a sufficient amount of zero calorie liquid.  Patient knows she needs to lose weight prior to surgery. Exercise incorporating cardio and strength training limited due to chronic conditions- Hx  of Cervical / Lumbar Degenerative Disc Disease.Pt is currently under the care of a neurologist and may consider Physical Therapy/ Aquatic Therapy. All questions were answered. Discussed and reviewed further requirements needed prior to scheduling surgery. Hx of poor sleep apnea - hx of caffeine/ nicotine intake. Pt aware she needs to quit smoking before surgery. \par

## 2020-10-20 NOTE — ASSESSMENT
[FreeTextEntry1] : 45 year old F undergoing workup for laparoscopic sleeve gastrectomy here for a follow up WEIGH IN TELEMEDICINE VISIT due to COVID-19 Pandemic. Weight loss since last visit.  Patient is currently in the process of completing her workup as well as a medically supervised diet. \par \par Pre op education classes completed. \par Nutrition one on one scheduled on 10//21/2020 Psych -scheduled on 10/29/20 \par 4 additional weigh in visits prior to surgery- November, December, January , February 2021. Patient is aware she needs to lose weight prior to surgery. \par Plan to schedule GI consult and subsequent Upper EGD. \par Plan to continue to follow up with neurologist as instructed/ may consider Aquatic Physical Therapy in the near future.\par Needs letter of support/ diet history / routine labs prior to surgery.\par Appointments  and testing with cardiology /pulmonary scheduled. \par \par Nutritional counseling has been provided. The patient is encouraged to remain calorie conscious and continue a low fat, low carbohydrate, protein focus diet. Pt encouraged to participate in a daily exercise regimen incorporating cardio and strength training. \par \par Return to office in 4 weeks for next weigh in visit via telemedicine. \par

## 2020-10-21 ENCOUNTER — APPOINTMENT (OUTPATIENT)
Dept: BARIATRICS/WEIGHT MGMT | Facility: CLINIC | Age: 45
End: 2020-10-21

## 2020-10-29 ENCOUNTER — APPOINTMENT (OUTPATIENT)
Dept: BARIATRICS/WEIGHT MGMT | Facility: CLINIC | Age: 45
End: 2020-10-29
Payer: MEDICAID

## 2020-10-29 VITALS — HEIGHT: 67 IN | BODY MASS INDEX: 41.44 KG/M2 | WEIGHT: 264 LBS

## 2020-10-29 PROCEDURE — 97802 MEDICAL NUTRITION INDIV IN: CPT | Mod: 95

## 2020-11-17 ENCOUNTER — APPOINTMENT (OUTPATIENT)
Dept: BARIATRICS | Facility: CLINIC | Age: 45
End: 2020-11-17

## 2020-11-21 DIAGNOSIS — Z01.818 ENCOUNTER FOR OTHER PREPROCEDURAL EXAMINATION: ICD-10-CM

## 2020-11-22 ENCOUNTER — APPOINTMENT (OUTPATIENT)
Dept: DISASTER EMERGENCY | Facility: CLINIC | Age: 45
End: 2020-11-22

## 2020-11-25 ENCOUNTER — APPOINTMENT (OUTPATIENT)
Dept: SLEEP CENTER | Facility: CLINIC | Age: 45
End: 2020-11-25

## 2020-11-30 ENCOUNTER — APPOINTMENT (OUTPATIENT)
Dept: BARIATRICS/WEIGHT MGMT | Facility: CLINIC | Age: 45
End: 2020-11-30

## 2020-12-08 ENCOUNTER — APPOINTMENT (OUTPATIENT)
Dept: BARIATRICS/WEIGHT MGMT | Facility: CLINIC | Age: 45
End: 2020-12-08

## 2020-12-22 ENCOUNTER — APPOINTMENT (OUTPATIENT)
Dept: BARIATRICS | Facility: CLINIC | Age: 45
End: 2020-12-22

## 2021-01-19 ENCOUNTER — APPOINTMENT (OUTPATIENT)
Dept: BARIATRICS | Facility: CLINIC | Age: 46
End: 2021-01-19

## 2021-01-20 PROCEDURE — ZZZZZ: CPT

## 2021-02-16 ENCOUNTER — APPOINTMENT (OUTPATIENT)
Dept: BARIATRICS | Facility: CLINIC | Age: 46
End: 2021-02-16

## 2021-03-09 ENCOUNTER — APPOINTMENT (OUTPATIENT)
Dept: NEUROLOGY | Facility: CLINIC | Age: 46
End: 2021-03-09

## 2021-06-24 ENCOUNTER — EMERGENCY (EMERGENCY)
Facility: HOSPITAL | Age: 46
LOS: 1 days | Discharge: HOME | End: 2021-06-24
Attending: EMERGENCY MEDICINE | Admitting: EMERGENCY MEDICINE
Payer: MEDICAID

## 2021-06-24 VITALS
TEMPERATURE: 98 F | OXYGEN SATURATION: 100 % | RESPIRATION RATE: 18 BRPM | HEART RATE: 100 BPM | SYSTOLIC BLOOD PRESSURE: 143 MMHG | DIASTOLIC BLOOD PRESSURE: 84 MMHG

## 2021-06-24 VITALS
DIASTOLIC BLOOD PRESSURE: 75 MMHG | SYSTOLIC BLOOD PRESSURE: 122 MMHG | HEART RATE: 88 BPM | RESPIRATION RATE: 18 BRPM | TEMPERATURE: 98 F | OXYGEN SATURATION: 98 %

## 2021-06-24 LAB
ALBUMIN SERPL ELPH-MCNC: 4.3 G/DL — SIGNIFICANT CHANGE UP (ref 3.3–5)
ALP SERPL-CCNC: 101 U/L — SIGNIFICANT CHANGE UP (ref 40–120)
ALT FLD-CCNC: 37 U/L — HIGH (ref 4–33)
ANION GAP SERPL CALC-SCNC: 14 MMOL/L — SIGNIFICANT CHANGE UP (ref 7–14)
APPEARANCE UR: CLEAR — SIGNIFICANT CHANGE UP
AST SERPL-CCNC: 29 U/L — SIGNIFICANT CHANGE UP (ref 4–32)
BACTERIA # UR AUTO: ABNORMAL
BILIRUB SERPL-MCNC: 0.2 MG/DL — SIGNIFICANT CHANGE UP (ref 0.2–1.2)
BILIRUB UR-MCNC: NEGATIVE — SIGNIFICANT CHANGE UP
BUN SERPL-MCNC: 12 MG/DL — SIGNIFICANT CHANGE UP (ref 7–23)
CALCIUM SERPL-MCNC: 9.8 MG/DL — SIGNIFICANT CHANGE UP (ref 8.4–10.5)
CHLORIDE SERPL-SCNC: 102 MMOL/L — SIGNIFICANT CHANGE UP (ref 98–107)
CO2 SERPL-SCNC: 24 MMOL/L — SIGNIFICANT CHANGE UP (ref 22–31)
COLOR SPEC: YELLOW — SIGNIFICANT CHANGE UP
CREAT SERPL-MCNC: 0.79 MG/DL — SIGNIFICANT CHANGE UP (ref 0.5–1.3)
DIFF PNL FLD: NEGATIVE — SIGNIFICANT CHANGE UP
EPI CELLS # UR: 12 /HPF — HIGH (ref 0–5)
GLUCOSE SERPL-MCNC: 94 MG/DL — SIGNIFICANT CHANGE UP (ref 70–99)
GLUCOSE UR QL: NEGATIVE — SIGNIFICANT CHANGE UP
HCT VFR BLD CALC: 44.2 % — SIGNIFICANT CHANGE UP (ref 34.5–45)
HGB BLD-MCNC: 14.6 G/DL — SIGNIFICANT CHANGE UP (ref 11.5–15.5)
HYALINE CASTS # UR AUTO: 2 /LPF — SIGNIFICANT CHANGE UP (ref 0–7)
KETONES UR-MCNC: NEGATIVE — SIGNIFICANT CHANGE UP
LEUKOCYTE ESTERASE UR-ACNC: NEGATIVE — SIGNIFICANT CHANGE UP
MCHC RBC-ENTMCNC: 32.2 PG — SIGNIFICANT CHANGE UP (ref 27–34)
MCHC RBC-ENTMCNC: 33 GM/DL — SIGNIFICANT CHANGE UP (ref 32–36)
MCV RBC AUTO: 97.4 FL — SIGNIFICANT CHANGE UP (ref 80–100)
NITRITE UR-MCNC: NEGATIVE — SIGNIFICANT CHANGE UP
NRBC # BLD: 0 /100 WBCS — SIGNIFICANT CHANGE UP
NRBC # FLD: 0 K/UL — SIGNIFICANT CHANGE UP
PH UR: 6.5 — SIGNIFICANT CHANGE UP (ref 5–8)
PLATELET # BLD AUTO: 325 K/UL — SIGNIFICANT CHANGE UP (ref 150–400)
POTASSIUM SERPL-MCNC: 4.1 MMOL/L — SIGNIFICANT CHANGE UP (ref 3.5–5.3)
POTASSIUM SERPL-SCNC: 4.1 MMOL/L — SIGNIFICANT CHANGE UP (ref 3.5–5.3)
PROT SERPL-MCNC: 7.4 G/DL — SIGNIFICANT CHANGE UP (ref 6–8.3)
PROT UR-MCNC: ABNORMAL
RBC # BLD: 4.54 M/UL — SIGNIFICANT CHANGE UP (ref 3.8–5.2)
RBC # FLD: 12.6 % — SIGNIFICANT CHANGE UP (ref 10.3–14.5)
RBC CASTS # UR COMP ASSIST: 3 /HPF — SIGNIFICANT CHANGE UP (ref 0–4)
SODIUM SERPL-SCNC: 140 MMOL/L — SIGNIFICANT CHANGE UP (ref 135–145)
SP GR SPEC: 1.03 — HIGH (ref 1.01–1.02)
UROBILINOGEN FLD QL: SIGNIFICANT CHANGE UP
WBC # BLD: 13.77 K/UL — HIGH (ref 3.8–10.5)
WBC # FLD AUTO: 13.77 K/UL — HIGH (ref 3.8–10.5)
WBC UR QL: 2 /HPF — SIGNIFICANT CHANGE UP (ref 0–5)

## 2021-06-24 PROCEDURE — 99284 EMERGENCY DEPT VISIT MOD MDM: CPT

## 2021-06-24 PROCEDURE — 76705 ECHO EXAM OF ABDOMEN: CPT | Mod: 26

## 2021-06-24 PROCEDURE — 74177 CT ABD & PELVIS W/CONTRAST: CPT | Mod: 26

## 2021-06-24 RX ORDER — KETOROLAC TROMETHAMINE 30 MG/ML
15 SYRINGE (ML) INJECTION ONCE
Refills: 0 | Status: DISCONTINUED | OUTPATIENT
Start: 2021-06-24 | End: 2021-06-24

## 2021-06-24 RX ORDER — SODIUM CHLORIDE 9 MG/ML
1000 INJECTION INTRAMUSCULAR; INTRAVENOUS; SUBCUTANEOUS ONCE
Refills: 0 | Status: COMPLETED | OUTPATIENT
Start: 2021-06-24 | End: 2021-06-24

## 2021-06-24 RX ADMIN — Medication 15 MILLIGRAM(S): at 22:01

## 2021-06-24 RX ADMIN — SODIUM CHLORIDE 1000 MILLILITER(S): 9 INJECTION INTRAMUSCULAR; INTRAVENOUS; SUBCUTANEOUS at 21:35

## 2021-06-24 NOTE — ED ADULT NURSE NOTE - OBJECTIVE STATEMENT
patient  Alert & ox3, complains of  of right  sided abd pain and BRBPR since yesterday, pt . evaluated by MD.Placed 20g angiocath right  AC., labs drawn and sent. Patient is comfortable , patient will be waiting for results, further evaluation and disposition.  made comfortable.will continue to monitor.

## 2021-06-24 NOTE — ED PROVIDER NOTE - CLINICAL SUMMARY MEDICAL DECISION MAKING FREE TEXT BOX
46 yr old with BRBPR , for 2 days no rectal pain  or external hemorrhoids, will check cbc cmp , occult blood   RUQ pain , may or may not be related, will check UA CT scan and reassess

## 2021-06-24 NOTE — ED PROVIDER NOTE - NSFOLLOWUPINSTRUCTIONS_ED_ALL_ED_FT
Follow up with gastroenterology for colonoscopy as soon as possible   return for recurrent or heavy bleeding  avoid aspirin and ibuprofen Follow up with gastroenterology for colonoscopy as soon as possible   return for recurrent or heavy bleeding  avoid aspirin and ibuprofen    also discuss with GI steatosis    consider  obesity medicine:   Siobhan Barclay  [phone]

## 2021-06-24 NOTE — ED ADULT TRIAGE NOTE - CHIEF COMPLAINT QUOTE
p/t c/o of rt sided abd pain and BRBPR since yesterday, denies any nausea or vomiting, denies blood thinner use

## 2021-06-24 NOTE — ED PROVIDER NOTE - CARE PLAN
Principal Discharge DX:	BRBPR (bright red blood per rectum)   Principal Discharge DX:	BRBPR (bright red blood per rectum)  Secondary Diagnosis:	Steatosis of liver

## 2021-06-24 NOTE — ED PROVIDER NOTE - OBJECTIVE STATEMENT
46 yr old female c/o blood with M, nml brown stool, with blood in toilet, today large tiago, no rectal pain, no hx of hemorhois,. Denies change in BH, no n/v. C/o worsening RUQ pain radiating to back for months. No w/u done. Denies dysuria, hematuria, no change with eating. no bloating.   On lexapro and xanex for depression, no change recently   No travel, no bad food noted.  S/p tubal ligation

## 2021-07-03 ENCOUNTER — EMERGENCY (EMERGENCY)
Facility: HOSPITAL | Age: 46
LOS: 1 days | Discharge: HOME | End: 2021-07-03
Attending: STUDENT IN AN ORGANIZED HEALTH CARE EDUCATION/TRAINING PROGRAM | Admitting: STUDENT IN AN ORGANIZED HEALTH CARE EDUCATION/TRAINING PROGRAM
Payer: MEDICAID

## 2021-07-03 VITALS
DIASTOLIC BLOOD PRESSURE: 81 MMHG | HEART RATE: 110 BPM | SYSTOLIC BLOOD PRESSURE: 151 MMHG | TEMPERATURE: 98 F | RESPIRATION RATE: 18 BRPM | OXYGEN SATURATION: 98 %

## 2021-07-03 VITALS
TEMPERATURE: 98 F | DIASTOLIC BLOOD PRESSURE: 94 MMHG | SYSTOLIC BLOOD PRESSURE: 148 MMHG | RESPIRATION RATE: 16 BRPM | HEART RATE: 91 BPM | OXYGEN SATURATION: 100 %

## 2021-07-03 PROCEDURE — 99283 EMERGENCY DEPT VISIT LOW MDM: CPT

## 2021-07-03 RX ORDER — IBUPROFEN 200 MG
600 TABLET ORAL ONCE
Refills: 0 | Status: COMPLETED | OUTPATIENT
Start: 2021-07-03 | End: 2021-07-03

## 2021-07-03 RX ORDER — ACETAMINOPHEN 500 MG
975 TABLET ORAL ONCE
Refills: 0 | Status: COMPLETED | OUTPATIENT
Start: 2021-07-03 | End: 2021-07-03

## 2021-07-03 RX ORDER — LIDOCAINE 4 G/100G
10 CREAM TOPICAL ONCE
Refills: 0 | Status: COMPLETED | OUTPATIENT
Start: 2021-07-03 | End: 2021-07-03

## 2021-07-03 RX ORDER — OXYCODONE HYDROCHLORIDE 5 MG/1
1 TABLET ORAL
Qty: 4 | Refills: 0
Start: 2021-07-03 | End: 2021-07-06

## 2021-07-03 RX ADMIN — Medication 600 MILLIGRAM(S): at 18:15

## 2021-07-03 RX ADMIN — Medication 975 MILLIGRAM(S): at 16:07

## 2021-07-03 RX ADMIN — LIDOCAINE 10 MILLILITER(S): 4 CREAM TOPICAL at 18:15

## 2021-07-03 NOTE — ED PROVIDER NOTE - PHYSICAL EXAMINATION
G: NAD, cooperative with exam   H: NCAT  E: EOMI, no conjunctival pallor, no oropharyngeal edema/exudate noted, ttp over left upper molar, s/p filling (which pt states she did at home)  M: Mucous membranes moist   R: CTABL, nWOB  C: Nl S1/S2, no mrg  A: Soft, NT/ND, no rebound/guarding   MSK: no calf tenderness, no LE edema

## 2021-07-03 NOTE — ED PROVIDER NOTE - OBJECTIVE STATEMENT
44 female, Hx. Smoking and Prior Dental Infection - presents with left upper molar pain. Onset 2 days ago. Has been trying to get an apt with oral surgeon, though unsuccessful secondary to insurance issues. Last took Advil 11am today. Pain worsens with food chewing. Low po intake as a result. No F/C/NS/N/V/D. No difficulty swallowing or chest pain or SOB.

## 2021-07-03 NOTE — ED PROVIDER NOTE - NSFOLLOWUPCLINICS_GEN_ALL_ED_FT
[facility]  Dental  [address] [place] [id number]  Phone: [phone]  Fax:

## 2021-07-03 NOTE — PROGRESS NOTE ADULT - SUBJECTIVE AND OBJECTIVE BOX
CC: 46 Year old female presents with tooth pain in the upper left quad with associated facial and gingival swelling for two days.    HPI: Patient reports worsening pain and swelling in ULQ . Pt states pain started two days ago after placing temporary filling on and upper left tooth to "prevent food from getting in". Pain is constant, throbbing. Prior to dental evaluation, ED gave the patient pain meds with improvement in symptoms.     Med HX: Patient states " I had an unknown stomach ulcer, and I cannot take advil."  No significant past surgical history    DENTAL PAIN    8    SysAdmin_VisitLink      Allergies: No Known Drug Allergies  strawberry (Angioedema; Blisters; Anaphylaxis)    RX:    EOE: (+) mild swelling of upper left cheek  TMJ (WNL)  Trismus (-)  LAD (-)  Dysphagia (-)    IOE: Permanent dentition.   (+) Grossly decayed and fractured tooth #15 with temporary filling  (+) swelling of buccal vestibule adjacent to tooth 15  (+) palpation tender on the upper left buccal vestibule  Percussion (+) tooth #15 sensitive   Tongue/Floor of Mouth (WNL)  Mobility (-)     Radiographs: PA taken and interpreted.   1. #15 grossly decayed with large temporary filling and PARL  2. #16 large mesial decay.       Assessment: Symptomatic apical periodontitis, gross caries and acute abscess tooth 15.    Treatment: Discussed radiographic and clinical findings with patient. Disccused RBA of recommended treatment. Obtained verbal consent. Applied 20% benzocaine. Administered 2 carpules 4% septocaine 1:100k epi via local infiltration with changing of needles after each administration. Incised to bone, dissected fascial planes, minor hemopurulence appreciated. Irrigated copiously with sterline saline. Penrose drain placed with 1 3-0 vicyrl suture. Hemostasis achieved. POIG. All questions answered.    Recommendations:   1. OTC pain medication as needed.  2. Per ED, complete course of antibiotics.  3. F/U in 2-3 business days for drain removal with either outpatient dentist or [facility] dental clinic [phone].  4. If any difficulty breathing/swallowing or fever and swelling occur, return to ED.      Jeffrey Baron DDS  Marilyn Reid DDS  Meagan Oro DDS [id number]

## 2021-07-03 NOTE — ED PROVIDER NOTE - PROGRESS NOTE DETAILS
Trudi Barrios MD (PGY2) -  Pt seen & reassessed.  Pt symptomatically improved.  NAD, VSS, pt tolerated PO & ambulated w/steady unassisted gait in the ED.  We discussed the results of ED w/u w/patient (incl. presumptive Emergency Department dx, associated anticipatory guidance, stressing importance of prompt f/u, return precautions), & gave them a copy of results.  Patient verbalized understanding of ED course & agreed with our f/u recommendations, has decisional making capacity.  Pt st they will f/u w/PMD within the next 3 days; pt agrees to call today or tomorrow for an appointment. Pt agrees to return to the ED if there is any worsening or concerning symptoms.

## 2021-07-03 NOTE — ED PROVIDER NOTE - PATIENT PORTAL LINK FT
You can access the FollowMyHealth Patient Portal offered by [facility] by registering at the following website: [email or web address]. By joining [facility]’s FollowMyHealth portal, you will also be able to view your health information using other applications (apps) compatible with our system.

## 2021-07-03 NOTE — ED ADULT NURSE NOTE - NSIMPLEMENTINTERV_GEN_ALL_ED
Implemented All Universal Safety Interventions:  [place] to call system. Call bell, personal items and telephone within reach. Instruct patient to call for assistance. Room bathroom lighting operational. Non-slip footwear when patient is off stretcher. Physically safe environment: no spills, clutter or unnecessary equipment. Stretcher in lowest position, wheels locked, appropriate side rails in place.

## 2021-07-03 NOTE — ED PROVIDER NOTE - NSFOLLOWUPINSTRUCTIONS_ED_ALL_ED_FT
You were seen in the emergency department for: tooth pain  From this ED visit you were prescribed: Augmentin one tablet two times per day for 7 days     FOLLOW UP in 2-3 business days for drain removal with either outpatient dentist or [facility] dental clinic [phone].    If you experience any difficulty breathing/swallowing or fever and swelling occur, return to ED.    You may be contacted by the Emergency Department Referrals Coordinator to set up your follow-up appointment within 24-48 hours of your discharge, Monday to Friday. We recommend you follow up with: dentistry     Please return to the Emergency Department if you experience any of the following symptoms:   - Shortness of breath or trouble breathing  - Pressure, pain or tightness in the chest  - Face drooping, arm weakness or speech difficulty  - Persistence of severe vomiting  - Head injury or loss of consciousness  - Nonstop bleeding or an open wound    (1) Follow up with your primary care physician within the next 24-48 hours as discussed. In addition, we did not find evidence of a life threatening illness on your testing here today, but listed below are the specialists that will be necessary to see as an outpatient to continue the workup.  Please call the numbers listed below or [phone] to set up the necessary appointments.  (2) Take Tylenol (up to 1000mg or 1 g)  and/or Motrin (up to 600mg) up to every 6 hours as needed for pain.   (3) If you had an IV (intravenous) line placed, it was removed. Sometimes, after IV removal, that area can be tender for a few days; if it develops redness and swelling, those could be signs of infection; in which case, return to the Emergency Department for assessment.  (4) Please continue taking all of your home medications as directed.

## 2021-07-03 NOTE — ED PROVIDER NOTE - NS ED ROS FT
Gen: No F/C/NS  Eyes: No changes in vision   ENT: No ear pain, congestion, sore throat. +tooth pain +pain with chewing   Resp: No cough or trouble breathing  Cardiovascular: No chest pain or palpitation  Gastroenteric: No N/V/D  :  No change in urine output, dysuria or hematuria   MS: No joint or muscle pain   Neuro: No headache; no abnormal movements

## 2021-07-03 NOTE — ED ADULT TRIAGE NOTE - CHIEF COMPLAINT QUOTE
Pt c/o toothache to top left molar, requesting to see oral surgeon. Pt states she takes Advil but provides little relief. Denies PMH.

## 2021-07-03 NOTE — ED ADULT NURSE NOTE - OBJECTIVE STATEMENT
Pt received AOx4, ambulatory at baseline w. no pmhx presents to the ED /w chief complaint of toothache to the top left molar. Pt states she is supposed to see her dentist on Monday, but pain became intolerable so decided to come to ED. Pt also endorses left sided headache. Denies any other symptoms. Medicated per MD order. Daily smoker.

## 2021-07-03 NOTE — PROGRESS NOTE ADULT - NSICDXPILOT_GEN_ALL_CORE
[place] Propranolol Pregnancy And Lactation Text: This medication is Pregnancy Category C and it isn't known if it is safe during pregnancy. It is excreted in breast milk.

## 2021-07-03 NOTE — ED PROVIDER NOTE - CLINICAL SUMMARY MEDICAL DECISION MAKING FREE TEXT BOX
44 female, Hx. Smoking and Prior Dental Infection - presents with left upper molar pain. L upper molar pain. Plan: analgesics, dentist

## 2021-08-01 ENCOUNTER — OUTPATIENT (OUTPATIENT)
Dept: OUTPATIENT SERVICES | Facility: HOSPITAL | Age: 46
LOS: 1 days | End: 2021-08-01
Payer: MEDICAID

## 2021-08-09 ENCOUNTER — APPOINTMENT (OUTPATIENT)
Dept: GASTROENTEROLOGY | Facility: CLINIC | Age: 46
End: 2021-08-09
Payer: MEDICAID

## 2021-08-09 VITALS
OXYGEN SATURATION: 95 % | BODY MASS INDEX: 43.95 KG/M2 | DIASTOLIC BLOOD PRESSURE: 74 MMHG | WEIGHT: 280 LBS | TEMPERATURE: 97.2 F | SYSTOLIC BLOOD PRESSURE: 136 MMHG | HEART RATE: 111 BPM | HEIGHT: 67 IN

## 2021-08-09 DIAGNOSIS — E66.01 MORBID (SEVERE) OBESITY DUE TO EXCESS CALORIES: ICD-10-CM

## 2021-08-09 DIAGNOSIS — K62.5 HEMORRHAGE OF ANUS AND RECTUM: ICD-10-CM

## 2021-08-09 DIAGNOSIS — Z80.1 FAMILY HISTORY OF MALIGNANT NEOPLASM OF TRACHEA, BRONCHUS AND LUNG: ICD-10-CM

## 2021-08-09 DIAGNOSIS — K76.0 FATTY (CHANGE OF) LIVER, NOT ELSEWHERE CLASSIFIED: ICD-10-CM

## 2021-08-09 DIAGNOSIS — Z12.11 ENCOUNTER FOR SCREENING FOR MALIGNANT NEOPLASM OF COLON: ICD-10-CM

## 2021-08-09 PROCEDURE — 99204 OFFICE O/P NEW MOD 45 MIN: CPT

## 2021-08-09 RX ORDER — CLINDAMYCIN HYDROCHLORIDE 300 MG/1
300 CAPSULE ORAL
Qty: 21 | Refills: 0 | Status: ACTIVE | COMMUNITY
Start: 2021-07-07

## 2021-08-09 RX ORDER — CYCLOBENZAPRINE HYDROCHLORIDE 5 MG/1
5 TABLET, FILM COATED ORAL
Qty: 60 | Refills: 0 | Status: ACTIVE | COMMUNITY
Start: 2021-08-02

## 2021-08-09 RX ORDER — ALPRAZOLAM 0.25 MG/1
0.25 TABLET ORAL
Refills: 0 | Status: ACTIVE | COMMUNITY

## 2021-08-09 RX ORDER — CHLORHEXIDINE GLUCONATE 4 %
LIQUID (ML) TOPICAL
Refills: 0 | Status: ACTIVE | COMMUNITY

## 2021-08-09 RX ORDER — TIZANIDINE 4 MG/1
4 TABLET ORAL
Qty: 60 | Refills: 0 | Status: ACTIVE | COMMUNITY
Start: 2021-08-03

## 2021-08-09 RX ORDER — OXYCODONE HYDROCHLORIDE 5 MG/1
5 CAPSULE ORAL
Qty: 4 | Refills: 0 | Status: ACTIVE | COMMUNITY
Start: 2021-07-04

## 2021-08-09 RX ORDER — HYDROCODONE BITARTRATE AND ACETAMINOPHEN 5; 325 MG/1; MG/1
5-325 TABLET ORAL
Qty: 12 | Refills: 0 | Status: ACTIVE | COMMUNITY
Start: 2021-07-10

## 2021-08-09 RX ORDER — AMOXICILLIN AND CLAVULANATE POTASSIUM 875; 125 MG/1; MG/1
875-125 TABLET, COATED ORAL
Qty: 14 | Refills: 0 | Status: ACTIVE | COMMUNITY
Start: 2021-07-03

## 2021-08-09 NOTE — ASSESSMENT
[FreeTextEntry1] : Patient is referred for screening colonoscopy.  She did have an episode of rectal bleeding and presented to the emergency room.  This is likely due to hemorrhoids.

## 2021-08-09 NOTE — PHYSICAL EXAM
[General Appearance - Alert] : alert [General Appearance - In No Acute Distress] : in no acute distress [Sclera] : the sclera and conjunctiva were normal [PERRL With Normal Accommodation] : pupils were equal in size, round, and reactive to light [Extraocular Movements] : extraocular movements were intact [Outer Ear] : the ears and nose were normal in appearance [Oropharynx] : the oropharynx was normal [Neck Appearance] : the appearance of the neck was normal [Neck Cervical Mass (___cm)] : no neck mass was observed [Jugular Venous Distention Increased] : there was no jugular-venous distention [Thyroid Diffuse Enlargement] : the thyroid was not enlarged [Thyroid Nodule] : there were no palpable thyroid nodules [Auscultation Breath Sounds / Voice Sounds] : lungs were clear to auscultation bilaterally [Heart Rate And Rhythm] : heart rate was normal and rhythm regular [Heart Sounds] : normal S1 and S2 [Murmurs] : no murmurs [Heart Sounds Gallop] : no gallops [Heart Sounds Pericardial Friction Rub] : no pericardial rub [Bowel Sounds] : normal bowel sounds [Abdomen Soft] : soft [Abdomen Tenderness] : non-tender [] : no hepato-splenomegaly [Abdomen Mass (___ Cm)] : no abdominal mass palpated [No CVA Tenderness] : no ~M costovertebral angle tenderness [No Spinal Tenderness] : no spinal tenderness [Abnormal Walk] : normal gait [Nail Clubbing] : no clubbing  or cyanosis of the fingernails [Musculoskeletal - Swelling] : no joint swelling seen [Motor Tone] : muscle strength and tone were normal [Impaired Insight] : insight and judgment were intact [Oriented To Time, Place, And Person] : oriented to person, place, and time [Affect] : the affect was normal

## 2021-08-09 NOTE — HISTORY OF PRESENT ILLNESS
[FreeTextEntry1] : Patient with rectal bleeding in June.  She presented to the emergency room.  Her hematocrit was 44.  She underwent an abdominal sonogram that showed severe fatty liver.  The gallbladder was normal.  She also had a CAT scan of the abdomen and pelvis that showed biliary sludge and a fatty liver.\par She has no further bleeding.  Her bowel movements are usually soft.  She denies any heartburn or dysphagia.  She was considering bariatric surgery but is no longer considering this option.

## 2021-08-09 NOTE — REASON FOR VISIT
[Initial Evaluation] : an initial evaluation [FreeTextEntry1] : Rectal bleeding, Screening colonoscopy

## 2021-08-10 RX ORDER — POLYETHYLENE GLYCOL 3350, SODIUM SULFATE, SODIUM CHLORIDE, POTASSIUM CHLORIDE, ASCORBIC ACID, SODIUM ASCORBATE 140-9-5.2G
140 KIT ORAL
Qty: 1 | Refills: 0 | Status: ACTIVE | COMMUNITY
Start: 2021-08-09 | End: 1900-01-01

## 2021-08-28 ENCOUNTER — EMERGENCY (EMERGENCY)
Facility: HOSPITAL | Age: 46
LOS: 1 days | Discharge: HOME | End: 2021-08-28
Attending: EMERGENCY MEDICINE | Admitting: EMERGENCY MEDICINE
Payer: MEDICAID

## 2021-08-28 ENCOUNTER — TRANSCRIPTION ENCOUNTER (OUTPATIENT)
Age: 46
End: 2021-08-28

## 2021-08-28 VITALS
RESPIRATION RATE: 20 BRPM | TEMPERATURE: 99 F | OXYGEN SATURATION: 95 % | DIASTOLIC BLOOD PRESSURE: 89 MMHG | HEART RATE: 112 BPM | SYSTOLIC BLOOD PRESSURE: 142 MMHG

## 2021-08-28 VITALS
SYSTOLIC BLOOD PRESSURE: 110 MMHG | DIASTOLIC BLOOD PRESSURE: 60 MMHG | RESPIRATION RATE: 22 BRPM | OXYGEN SATURATION: 95 % | HEART RATE: 100 BPM

## 2021-08-28 LAB
ALBUMIN SERPL ELPH-MCNC: 3.7 G/DL — SIGNIFICANT CHANGE UP (ref 3.3–5)
ALP SERPL-CCNC: 97 U/L — SIGNIFICANT CHANGE UP (ref 40–120)
ALT FLD-CCNC: 40 U/L — HIGH (ref 4–33)
ANION GAP SERPL CALC-SCNC: 16 MMOL/L — HIGH (ref 7–14)
APTT BLD: 30.3 SEC — SIGNIFICANT CHANGE UP (ref 27–36.3)
AST SERPL-CCNC: 26 U/L — SIGNIFICANT CHANGE UP (ref 4–32)
BILIRUB SERPL-MCNC: <0.2 MG/DL — SIGNIFICANT CHANGE UP (ref 0.2–1.2)
BUN SERPL-MCNC: 7 MG/DL — SIGNIFICANT CHANGE UP (ref 7–23)
CALCIUM SERPL-MCNC: 9.2 MG/DL — SIGNIFICANT CHANGE UP (ref 8.4–10.5)
CHLORIDE SERPL-SCNC: 97 MMOL/L — LOW (ref 98–107)
CO2 SERPL-SCNC: 27 MMOL/L — SIGNIFICANT CHANGE UP (ref 22–31)
CREAT SERPL-MCNC: 0.52 MG/DL — SIGNIFICANT CHANGE UP (ref 0.5–1.3)
D DIMER BLD IA.RAPID-MCNC: <150 NG/ML DDU — SIGNIFICANT CHANGE UP
GLUCOSE SERPL-MCNC: 188 MG/DL — HIGH (ref 70–99)
HCT VFR BLD CALC: 40.1 % — SIGNIFICANT CHANGE UP (ref 34.5–45)
HGB BLD-MCNC: 13.6 G/DL — SIGNIFICANT CHANGE UP (ref 11.5–15.5)
INR BLD: 0.96 RATIO — SIGNIFICANT CHANGE UP (ref 0.88–1.16)
MAGNESIUM SERPL-MCNC: 2 MG/DL — SIGNIFICANT CHANGE UP (ref 1.6–2.6)
MCHC RBC-ENTMCNC: 33.2 PG — SIGNIFICANT CHANGE UP (ref 27–34)
MCHC RBC-ENTMCNC: 33.9 GM/DL — SIGNIFICANT CHANGE UP (ref 32–36)
MCV RBC AUTO: 97.8 FL — SIGNIFICANT CHANGE UP (ref 80–100)
NRBC # BLD: 0 /100 WBCS — SIGNIFICANT CHANGE UP
NRBC # FLD: 0 K/UL — SIGNIFICANT CHANGE UP
PHOSPHATE SERPL-MCNC: 3.6 MG/DL — SIGNIFICANT CHANGE UP (ref 2.5–4.5)
PLATELET # BLD AUTO: 282 K/UL — SIGNIFICANT CHANGE UP (ref 150–400)
POTASSIUM SERPL-MCNC: 4.6 MMOL/L — SIGNIFICANT CHANGE UP (ref 3.5–5.3)
POTASSIUM SERPL-SCNC: 4.6 MMOL/L — SIGNIFICANT CHANGE UP (ref 3.5–5.3)
PROT SERPL-MCNC: 6.5 G/DL — SIGNIFICANT CHANGE UP (ref 6–8.3)
PROTHROM AB SERPL-ACNC: 11 SEC — SIGNIFICANT CHANGE UP (ref 10.6–13.6)
RBC # BLD: 4.1 M/UL — SIGNIFICANT CHANGE UP (ref 3.8–5.2)
RBC # FLD: 13 % — SIGNIFICANT CHANGE UP (ref 10.3–14.5)
SARS-COV-2 RNA SPEC QL NAA+PROBE: SIGNIFICANT CHANGE UP
SODIUM SERPL-SCNC: 140 MMOL/L — SIGNIFICANT CHANGE UP (ref 135–145)
WBC # BLD: 12.15 K/UL — HIGH (ref 3.8–10.5)
WBC # FLD AUTO: 12.15 K/UL — HIGH (ref 3.8–10.5)

## 2021-08-28 PROCEDURE — 93970 EXTREMITY STUDY: CPT | Mod: 26

## 2021-08-28 PROCEDURE — 93010 ELECTROCARDIOGRAM REPORT: CPT

## 2021-08-28 PROCEDURE — 99285 EMERGENCY DEPT VISIT HI MDM: CPT | Mod: 25

## 2021-08-28 PROCEDURE — 71275 CT ANGIOGRAPHY CHEST: CPT | Mod: 26

## 2021-08-28 NOTE — ED PROVIDER NOTE - NS ED ROS FT
REVIEW OF SYSTEMS:    CONSTITUTIONAL: No weakness, fevers or chills  EYES/ENT: No visual changes;  No vertigo or throat pain   NECK: No pain or stiffness  BACK: no pain or lesions   RESPIRATORY: No cough, wheezing, hemoptysis; No shortness of breath  CARDIOVASCULAR:+chest pressure   GASTROINTESTINAL: No abdominal or epigastric pain. No nausea, vomiting, or hematemesis; No diarrhea or constipation. No melena or hematochezia.  GENITOURINARY: No dysuria, frequency or hematuria  NEUROLOGICAL: No numbness or weakness  EXTREMITIES: no varicose veins, + pain in LE  SKIN: No itching, rashes

## 2021-08-28 NOTE — ED PROVIDER NOTE - CLINICAL SUMMARY MEDICAL DECISION MAKING FREE TEXT BOX
47 y/o with LE swelling and chest discomfort, Wells score 4.5, will get CTA to r/o PE and LE US to r/o DVT's. Lower likelihood CHF given uneven nature of swelling and recent echo normal.

## 2021-08-28 NOTE — ED ADULT NURSE NOTE - OBJECTIVE STATEMENT
Pt received to room 13. Pt A&Ox4, ambulatory. Pt c/o increasing lower extremity swelling since yesterday, worse on the right side. Pt states that she began to have "dull" chest pain while she was getting triaged. Pt states that she has intermittent SOB and coughing but pt states she always has these symptoms due to smoking. Respirations equal and unlabored, no acute distress noted. Denies headaches, dizziness, fever, cough, chills, n/v/d, recent sick contacts. 20g IV placed in left ac, labs drawn and sent as ordered. Cardiac monitor in place, NSR noted. Vital signs as noted, comfort measures provided, call bell within reach. Assessment ongoing.

## 2021-08-28 NOTE — ED PROVIDER NOTE - PHYSICAL EXAMINATION
PHYSICAL EXAM:  T(C): 36.6 (08-28-21 @ 07:33), Max: 37.2 (08-28-21 @ 05:24)  HR: 101 (08-28-21 @ 07:33) (101 - 112)  BP: 131/77 (08-28-21 @ 07:33) (131/77 - 142/89)  RR: 20 (08-28-21 @ 07:33) (20 - 20)  SpO2: 98% (08-28-21 @ 07:33) (95% - 98%)    GENERAL: NAD, well-developed  HEAD:  Atraumatic, Normocephalic  EYES: EOMI, PERRLA, conjunctiva and sclera clear  NECK: Supple, No JVD  CHEST/LUNG: Clear to auscultation bilaterally; No wheeze  HEART: Regular rhythm, tachycardic; No murmurs, rubs, or gallops  ABDOMEN: Soft, Nontender, Nondistended; Bowel sounds present  EXTREMITIES:  2+ Peripheral Pulses, No clubbing, cyanosis.  b/l swelling, nonpitting   PSYCH: AAOx3  NEUROLOGY: non-focal  SKIN: No rashes or lesions

## 2021-08-28 NOTE — ED PROVIDER NOTE - ATTENDING CONTRIBUTION TO CARE
DR. HAMILTON, ATTENDING MD-  I performed a face to face bedside interview with the patient regarding history of present illness, review of symptoms and past medical history. I completed an independent physical exam.  I have discussed the patient's plan of care with the resident.   Documentation as above in the note.    45 y/o female, smokes tobacco, here with b/l le swelling R>L, sob and cp since last night.  No ocp use, no recent long travel or recent surg.  Eval for pna ptx pe/dvt covid obtain cbc cmp trop ekg u/s venous duplex ctpa covid swab reassess.

## 2021-08-28 NOTE — ED PROVIDER NOTE - PATIENT PORTAL LINK FT
You can access the FollowMyHealth Patient Portal offered by [facility] by registering at the following website: [email or web address]. By joining [employer]’s FollowMyHealth portal, you will also be able to view your health information using other applications (apps) compatible with our system.

## 2021-08-28 NOTE — ED PROVIDER NOTE - NSFOLLOWUPINSTRUCTIONS_ED_ALL_ED_FT
You were seen in the Emergency Department for lower extremity swelling.     1) Advance activity as tolerated. Please keep your feet elevated when sitting down and use compression to decrease swelling.   2) Continue all previously prescribed medications as directed.    3) Follow up with your primary care physician in 2 weeks for follow up DVT scan - take copies of your results.  4) Return to the Emergency Department for worsening or persistent symptoms, and/or ANY NEW OR CONCERNING SYMPTOMS.

## 2021-08-28 NOTE — ED ADULT TRIAGE NOTE - CHIEF COMPLAINT QUOTE
Pt st" I felt tightness and swelling in rt leg since 7pm last night there was some swelling in left leg but the rt is much worse." Pt appears short of breath. "I also am feeling a dull pain in chest that just started , I feel short of breath but I normally feel short of breath" hx kidney lesion ,+ smoker.

## 2021-08-28 NOTE — ED ADULT NURSE REASSESSMENT NOTE - NS ED NURSE REASSESS COMMENT FT1
Pt awake, alert and oriented x 4 denies chest pain or worsening SOB.   Pt reports chronic SOB from smoking.   Dry cough noted.   Pt on continuous cardiac monitor with NSR noted, VSS.   IV site unremarkable.    Awaiting CT.   No acute distress.

## 2021-08-28 NOTE — ED PROVIDER NOTE - OBJECTIVE STATEMENT
47 y/o F presenting with LE swelling and chest discomfort. Patient reports swelling this morning and pain when she flexed her feet. Her right leg and ankle feels more swollen than her left. Patient reports that when she arrived to the ED she started to feel some slight chest pressure/discomfort. Patient is everyday smoker and does not walk much due to chronic lower back pain. She denies SOB, dizziness, palpitations.

## 2021-08-31 DIAGNOSIS — Z71.89 OTHER SPECIFIED COUNSELING: ICD-10-CM

## 2021-09-11 ENCOUNTER — APPOINTMENT (OUTPATIENT)
Dept: DISASTER EMERGENCY | Facility: CLINIC | Age: 46
End: 2021-09-11

## 2021-09-12 LAB — SARS-COV-2 N GENE NPH QL NAA+PROBE: NOT DETECTED

## 2021-09-15 ENCOUNTER — RESULT REVIEW (OUTPATIENT)
Age: 46
End: 2021-09-15

## 2021-09-15 ENCOUNTER — APPOINTMENT (OUTPATIENT)
Dept: GASTROENTEROLOGY | Facility: HOSPITAL | Age: 46
End: 2021-09-15
Payer: MEDICAID

## 2021-09-15 ENCOUNTER — OUTPATIENT (OUTPATIENT)
Dept: OUTPATIENT SERVICES | Facility: HOSPITAL | Age: 46
LOS: 1 days | Discharge: HOME | End: 2021-09-15
Payer: MEDICAID

## 2021-09-15 VITALS
OXYGEN SATURATION: 96 % | HEART RATE: 110 BPM | RESPIRATION RATE: 17 BRPM | TEMPERATURE: 98 F | DIASTOLIC BLOOD PRESSURE: 71 MMHG | SYSTOLIC BLOOD PRESSURE: 152 MMHG | HEIGHT: 67 IN | WEIGHT: 279.99 LBS

## 2021-09-15 VITALS
SYSTOLIC BLOOD PRESSURE: 120 MMHG | HEART RATE: 98 BPM | OXYGEN SATURATION: 99 % | RESPIRATION RATE: 18 BRPM | DIASTOLIC BLOOD PRESSURE: 77 MMHG

## 2021-09-15 DIAGNOSIS — R19.7 DIARRHEA, UNSPECIFIED: ICD-10-CM

## 2021-09-15 PROCEDURE — 88305 TISSUE EXAM BY PATHOLOGIST: CPT | Mod: 26

## 2021-09-15 PROCEDURE — 45380 COLONOSCOPY AND BIOPSY: CPT | Mod: 33

## 2021-09-16 LAB — SURGICAL PATHOLOGY STUDY: SIGNIFICANT CHANGE UP

## 2021-09-28 ENCOUNTER — APPOINTMENT (OUTPATIENT)
Dept: GASTROENTEROLOGY | Facility: CLINIC | Age: 46
End: 2021-09-28

## 2021-10-05 DIAGNOSIS — K82.8 OTHER SPECIFIED DISEASES OF GALLBLADDER: ICD-10-CM

## 2021-10-27 ENCOUNTER — APPOINTMENT (OUTPATIENT)
Dept: GASTROENTEROLOGY | Facility: CLINIC | Age: 46
End: 2021-10-27
Payer: COMMERCIAL

## 2021-10-27 VITALS
SYSTOLIC BLOOD PRESSURE: 154 MMHG | HEART RATE: 117 BPM | OXYGEN SATURATION: 97 % | WEIGHT: 280 LBS | HEIGHT: 67 IN | TEMPERATURE: 97.1 F | BODY MASS INDEX: 43.95 KG/M2 | DIASTOLIC BLOOD PRESSURE: 71 MMHG

## 2021-10-27 DIAGNOSIS — K63.5 POLYP OF COLON: ICD-10-CM

## 2021-10-27 DIAGNOSIS — D12.6 BENIGN NEOPLASM OF COLON, UNSPECIFIED: ICD-10-CM

## 2021-10-27 PROCEDURE — 99213 OFFICE O/P EST LOW 20 MIN: CPT

## 2021-10-27 NOTE — ASSESSMENT
[FreeTextEntry1] : Patient is status post a colonoscopy and had a tubular adenoma removed from the ascending colon and a hyperplastic polyp from the descending colon.

## 2021-10-27 NOTE — HISTORY OF PRESENT ILLNESS
[FreeTextEntry1] : Patient with rectal bleeding in June.  She presented to the emergency room.  Her hematocrit was 44.  She underwent an abdominal sonogram that showed severe fatty liver.  The gallbladder was normal.  She also had a CAT scan of the abdomen and pelvis that showed biliary sludge and a fatty liver.\par She has no further bleeding.  Her bowel movements are usually soft.  She denies any heartburn or dysphagia.  She was considering bariatric surgery but is no longer considering this option.\par \par Patient had a colonoscopy on 9/15/2021.  She had 2 polyps that were removed.  An acsending colon polyp was a tubular adenoma.  The polyp in the descending colon was removed as well and was a hyperplastic polyp.\par \par Patient is seeing a hematologist for several conditions including an elevated white blood count and borderline platelet count.

## 2021-11-17 PROCEDURE — 99214 OFFICE O/P EST MOD 30 MIN: CPT | Mod: 95

## 2021-12-01 PROCEDURE — G9005: CPT
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
## 2022-02-28 PROCEDURE — 99214 OFFICE O/P EST MOD 30 MIN: CPT | Mod: 95

## 2022-04-30 NOTE — ED PROVIDER NOTE - ATTENDING CONTRIBUTION TO CARE
I have personally performed a face to face medical and diagnostic evaluation of the patient. I have discussed with and reviewed the Resident's note and agree with the History, ROS, Physical Exam and MDM unless otherwise indicated. A brief summary of my personal evaluation and impression can be found below.    46F hx of smoking, prior dental infections presents with a cc of L upper molar pain x2 days taking meds at home without improvement was attempting to get OFMS appointment however was unable to do so given holiday weekend and insurance issues. No fevers. No bleeding. No neck pain swelling. Tolerating PO handling secretions, no rash. No neck stiffness. Voice at baseline. Denies n/v/f/c/cp/sob. Denies headache, syncope, lightheadedness, dizziness. Denies chest palpitations, abdominal pain. Denies edema. Denies dysuria, hematuria, BRBPR, tarry stools, diarrhea, constipation.     All other ROS negative, except as above and as per HPI and ROS section.    VITALS: Initial triage and subsequent vitals have been reviewed by me.  GEN APPEARANCE: WDWN, alert, non-toxic, NAD  HEAD: Atraumatic.  EYES: PERRLa, EOMI, vision grossly intact.   NECK: Supple  MOUTH: MMM, no tonsillar erythema, swelling or exudates, protecting airway, handling secretions. Diffuse poor dentition ttp over L upper molar possible early abscess? no obvious  CV: RRR, S1S2, no c/r/m/g. Cap refill <2 seconds. No bruits.   LUNGS: CTAB. No abnormal breath sounds.  ABDOMEN: Soft, NTND. No guarding or rebound.   MSK/EXT: No spinal or extremity point tenderness. No CVA ttp. Pelvis stable. No peripheral edema.  NEURO: Alert, follows commands. Weight bearing normal. Speech normal. Sensation and motor normal x4 extremities.   SKIN: Warm, dry and intact. No rash.  PSYCH: Appropriate      Plan/MDM: 46F hx of smoking, prior dental infections presents with a cc of L upper molar pain x2 days taking meds at home without improvement was attempting to get OFMS appointment however was unable to do so given holiday weekend and insurance issues exam vss non toxic PE as above c/f dental radha vs early abscess low c/f severe infection ie susiwigs, plan to discuss with dental pain control reassess Report given to CARON [place]

## 2022-08-24 NOTE — ED ADULT TRIAGE NOTE - MODE OF ARRIVAL
Got the approval from insurance so faxed to [facility] the approval notice for praluent.   Walk in

## 2022-11-28 ENCOUNTER — APPOINTMENT (OUTPATIENT)
Dept: GASTROENTEROLOGY | Facility: CLINIC | Age: 47
End: 2022-11-28

## 2022-12-14 PROCEDURE — 99214 OFFICE O/P EST MOD 30 MIN: CPT

## 2023-02-27 ENCOUNTER — EMERGENCY (EMERGENCY)
Facility: HOSPITAL | Age: 48
LOS: 1 days | Discharge: HOME | End: 2023-02-27
Attending: EMERGENCY MEDICINE | Admitting: EMERGENCY MEDICINE
Payer: MEDICAID

## 2023-02-27 VITALS
TEMPERATURE: 98 F | DIASTOLIC BLOOD PRESSURE: 81 MMHG | SYSTOLIC BLOOD PRESSURE: 146 MMHG | RESPIRATION RATE: 18 BRPM | OXYGEN SATURATION: 100 % | HEART RATE: 115 BPM

## 2023-02-27 VITALS
TEMPERATURE: 98 F | DIASTOLIC BLOOD PRESSURE: 88 MMHG | OXYGEN SATURATION: 100 % | HEART RATE: 107 BPM | RESPIRATION RATE: 16 BRPM | SYSTOLIC BLOOD PRESSURE: 153 MMHG

## 2023-02-27 PROCEDURE — 99284 EMERGENCY DEPT VISIT MOD MDM: CPT

## 2023-02-27 RX ORDER — IBUPROFEN 200 MG
600 TABLET ORAL ONCE
Refills: 0 | Status: COMPLETED | OUTPATIENT
Start: 2023-02-27 | End: 2023-02-27

## 2023-02-27 RX ORDER — ACETAMINOPHEN 500 MG
975 TABLET ORAL ONCE
Refills: 0 | Status: COMPLETED | OUTPATIENT
Start: 2023-02-27 | End: 2023-02-27

## 2023-02-27 RX ORDER — NICOTINE POLACRILEX 2 MG
1 GUM BUCCAL ONCE
Refills: 0 | Status: COMPLETED | OUTPATIENT
Start: 2023-02-27 | End: 2023-02-27

## 2023-02-27 RX ADMIN — Medication 1 PATCH: at 03:38

## 2023-02-27 RX ADMIN — Medication 975 MILLIGRAM(S): at 03:11

## 2023-02-27 RX ADMIN — Medication 600 MILLIGRAM(S): at 03:11
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
## 2023-02-27 NOTE — ED PROVIDER NOTE - NSFOLLOWUPINSTRUCTIONS_ED_ALL_ED_FT
You were evaluated in the ER and there is no evidence of an emergent medical condition. You are stable for discharge    Follow up with your PCP within 1-2 weeks for re evaluation    -If you do not have a PMD, please call [phone] to find one convenient for you or call our clinic at [phone].     Continue home medications as prescribed    You can take TYLENOL/ACETAMINOPHEN up to 4,000mg a day for your symptoms in four divided doses.     You can take MOTRIN/IBUPROFEN up to 2,400mg a day in four divided doses (for up to 5 days with food).     Return to the ER for new or worsening pain, severe chest pain or difficulty breathing, passing out, uncontrollable nausea and vomiting, or for any concern you would like evaluated. You were evaluated in the ER and there is no evidence of an emergent medical condition. You are stable for discharge    Follow up with your PCP within 1-2 weeks for re evaluation    -If you do not have a PMD, please call [phone] to find one convenient for you or call our clinic at [phone].     Continue home medications as prescribed    You can take TYLENOL/ACETAMINOPHEN up to 4,000mg a day for your symptoms in four to six divided doses.     You can take MOTRIN/IBUPROFEN up to 2,400mg a day in four divided doses (for up to 5 days with food).     Return to the ER for new or worsening pain, severe chest pain or difficulty breathing, passing out, uncontrollable nausea and vomiting, or for any concern you would like evaluated.
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
## 2023-02-27 NOTE — ED PROVIDER NOTE - PHYSICAL EXAMINATION
Vitals: borderline tachy, HTN  General: tearful  HEENT: bridge of nose without significant swelling, deformity, misalignment, no nasal septal hematoma, some dried blood in nares without active bleeding. dry blood on tongue without clear laceration, no new loose dentition  Eyes: EOMI, tracking appropriately  Neck: no tracheal deviation, no JVD, no midline tenderness, no strangulation marks, ranging >45 degrees without difficulty  Chest/Lungs: mild diffuse ttp, no crepitus , symmetric chest rise, speaking in complete sentences, no WOB, CTAB  Heart: skin and extremities well perfused, regular rate and rhythm,   Neuro: A+Ox3, ambulating without difficulty, CN grossly intact, 5/5 extremities  MSK: strength at baseline in all extremities, no muscle wasting or atrophy, no limitation in ROM due to pain, no focal joint or extremity pain  Skin: no new contusions, lacerations, except as above
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
## 2023-02-27 NOTE — ED PROVIDER NOTE - PATIENT PORTAL LINK FT
You can access the FollowMyHealth Patient Portal offered by [facility] by registering at the following website: [email or web address]. By joining [employer]’s FollowMyHealth portal, you will also be able to view your health information using other applications (apps) compatible with our system.
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
## 2023-07-14 NOTE — ASSESSMENT
[FreeTextEntry1] : Urged to stop smoking (consider nicotine gum). Consider bariatric surgery. \par \par RTO 6 months
done

## 2023-09-18 NOTE — ED ADULT TRIAGE NOTE - TEMPERATURE IN FAHRENHEIT (DEGREES F)
"Nohemy Cowarte" Vane was seen and treated in our emergency department on 9/18/2023.  She may return to work on 09/20/2023.       If you have any questions or concerns, please don't hesitate to call.      CRYSTAL Burrows RN RN    "
98.1

## 2024-04-08 ENCOUNTER — APPOINTMENT (OUTPATIENT)
Dept: GASTROENTEROLOGY | Facility: CLINIC | Age: 49
End: 2024-04-08

## 2024-12-16 PROCEDURE — 99214 OFFICE O/P EST MOD 30 MIN: CPT | Mod: 95

## 2025-01-13 PROCEDURE — 99214 OFFICE O/P EST MOD 30 MIN: CPT | Mod: 95

## 2025-02-10 PROCEDURE — 99214 OFFICE O/P EST MOD 30 MIN: CPT | Mod: 95
